# Patient Record
(demographics unavailable — no encounter records)

---

## 2017-10-13 NOTE — EDM.PDOC
ED HPI GENERAL MEDICAL PROBLEM





- General


Chief Complaint: Genitourinary Problem


Stated Complaint: FEMALE PROBLEMS


Time Seen by Provider: 10/13/17 01:04





- History of Present Illness


INITIAL COMMENTS - FREE TEXT/NARRATIVE: 





HISTORY AND PHYSICAL:





History of present illness:


The patient is a healthy 31-year-old female who presents with 3 days of dysuria 

frequency and urgency with urination and also of external genitalia irritation 

and discomfort. The patient states she was treated for a yeast infection a 

month ago in [place] and says that the discomfort started simultaneous with 

the urine symptoms. She has no pelvic pain no abdominal pain no vomiting or 

diarrhea no fevers and no flank pain. She has no gross vaginal discharge and 

she has a implant for contraception. Patient states that she was checked for 

STDs one month ago and is not worried about that. Patient states that the 

external genitalia are causing her discomfort and she has tried multiple over-

the-counter preparations including Vagisil Desitin





Review of systems: 


As per history of present illness and below otherwise all systems reviewed and 

negative.





Past medical history: 


As per history of present illness and as reviewed below otherwise 

noncontributory.





Surgical history: 


As per history of present illness and as reviewed below otherwise 

noncontributory.





Social history: 


No reported history of drug or alcohol abuse.





Family history: 


As per history of present illness and as reviewed below otherwise 

noncontributory.





Physical exam:


Gen.: Well-developed mildly overweight female who is nontoxic and speaking 

clearly and easily in the ED and vital signs have been noted by me


HEENT: Atraumatic, normocephalic,  negative for conjunctival pallor or scleral 

icterus, mucous membranes moist, throat clear, neck supple, nontender, trachea 

midline.


Lungs: Clear to auscultation, breath sounds equal bilaterally, chest nontender.


Heart: S1S2, regular rate and rhythm no overt murmurs


Abdomen: Soft, nondistended, nontender. NABS Negative for costovertebral 

tenderness.


Pelvis: Stable nontender.


Genitourinary: Labia majora and minora are within normal limits without any 

gross swelling erythema and there are no lesions seen in the region.


Rectal: Deferred.


Extremities: Atraumatic, negative for cords or calf pain. Neurovascular 

unremarkable.


Neuro: Awake, alert, oriented. Cranial nerves II through XII unremarkable. 

Cerebellum unremarkable. Motor and sensory unremarkable throughout. Exam 

nonfocal.





Diagnostics:


UA urine culture UCG





Therapeutics:


[]





Impression: 


Dysuria/early UTI





Definitive disposition and diagnosis as appropriate pending reevaluation and 

review of above.


  ** vaginal area


Pain Score (Numeric/FACES): 8





- Related Data


 Allergies











Allergy/AdvReac Type Severity Reaction Status Date / Time


 


aspirin Allergy  Rash Verified 10/13/17 01:10


 


Penicillins Allergy  Rash Verified 10/13/17 01:10











Home Meds: 


 Home Meds





. [No Known Home Meds]  10/13/17 [History]











ED ROS GENERAL





- Review of Systems


Review Of Systems: ROS reveals no pertinent complaints other than HPI.





ED EXAM, GENERAL





- Physical Exam


Exam: See Below (See dictation)





Course





- Vital Signs


Last Recorded V/S: 


 Last Vital Signs











Temp  36.5 C   10/13/17 01:11


 


Pulse  86   10/13/17 01:11


 


Resp  17   10/13/17 01:11


 


BP  108/65   10/13/17 01:11


 


Pulse Ox  98   10/13/17 01:11














- Orders/Labs/Meds


Orders: 


 Active Orders 24 hr











 Category Date Time Status


 


 CULTURE URINE [RM] Stat Lab  10/13/17 01:23 Received











Labs: 


 Laboratory Tests











  10/13/17 10/13/17 Range/Units





  01:23 01:23 


 


Urine Color   YELLOW  


 


Urine Appearance   CLEAR  


 


Urine pH   6.0  (5.0-8.0)  


 


Ur Specific Gravity   1.025  (1.001-1.035)  


 


Urine Protein   NEGATIVE  (NEGATIVE)  mg/dL


 


Urine Glucose (UA)   NEGATIVE  (NEGATIVE)  mg/dL


 


Urine Ketones   NEGATIVE  (NEGATIVE)  mg/dL


 


Urine Occult Blood   NEGATIVE  (NEGATIVE)  


 


Urine Nitrite   NEGATIVE  (NEGATIVE)  


 


Urine Bilirubin   NEGATIVE  (NEGATIVE)  


 


Urine Urobilinogen   0.2  (<2.0)  EU/dL


 


Ur Leukocyte Esterase   TRACE  (NEGATIVE)  


 


Urine RBC   0-2  (0-2/HPF)  


 


Urine WBC   1-3  (0-5/HPF)  


 


Ur Epithelial Cells   FEW  (NONE-FEW)  


 


Urine Bacteria   FEW  (NEGATIVE)  


 


Urine Mucus   LIGHT  (NONE-MOD)  


 


Urine HCG, Qual  NEGATIVE   (NEGATIVE)  














Departure





- Departure


Time of Disposition: 01:59


Disposition: Home, Self-Care 01


Condition: Good


Clinical Impression: 


 UTI, Urinary tract infectious disease, Dysuria, Vaginal irritation








- Discharge Information


Referrals: 


PCP,None [Primary Care Provider] - 


Forms:  ED Department Discharge


Additional Instructions: 


The following information is given to patients seen in the emergency department 

who are being discharged to home. This information is to outline your options 

for follow-up care. We provide all patients seen in our emergency department 

with a follow-up referral.





The need for follow-up, as well as the timing and circumstances, are variable 

depending upon the specifics of your emergency department visit.





If you don't have a primary care physician on staff, we will provide you with a 

referral. We always advise you to contact your personal physician following an 

emergency department visit to inform them of the circumstance of the visit and 

for follow-up with them and/or the need for any referrals to a consulting 

specialist.





The emergency department will also refer you to a specialist when appropriate. 

This referral assures that you have the opportunity for followup care with a 

specialist. All of these measure are taken in an effort to provide you with 

optimal care, which includes your followup.





Under all circumstances we always encourage you to contact your private 

physician who remains a resource for coordinating  your care. When calling for 

followup care, please make the office aware that this follow-up is from your 

recent emergency room visit. If for any reason you are refused follow-up, 

please contact the [facility] emergency 

department at [phone] and ask to speak to the emergency department 

charge nurse.





[facility] 


Primary care- Internal Medicine and Family [place] [id number]


[phone]





Please push hydration and use medications as prescribed from [facility], 

ciprofloxacin and Pyridium, until they are finished. Please also fill the 

prescription given to you for Monistat cream to apply to the external areas for 

discomfort. Please call and follow-up in our clinic for reevaluation and 

further care and return to ER as needed and as discussed.





- My Orders


Last 24 Hours: 


My Active Orders





10/13/17 01:23


CULTURE URINE [RM] Stat 














- Assessment/Plan


Last 24 Hours: 


My Active Orders





10/13/17 01:23


CULTURE URINE [RM] Stat